# Patient Record
Sex: MALE | Race: BLACK OR AFRICAN AMERICAN | NOT HISPANIC OR LATINO | Employment: FULL TIME | URBAN - METROPOLITAN AREA
[De-identification: names, ages, dates, MRNs, and addresses within clinical notes are randomized per-mention and may not be internally consistent; named-entity substitution may affect disease eponyms.]

---

## 2023-06-10 ENCOUNTER — HOSPITAL ENCOUNTER (EMERGENCY)
Facility: HOSPITAL | Age: 23
Discharge: LEFT BEFORE DISCHARGE | End: 2023-06-10
Payer: MEDICAID

## 2023-06-10 VITALS
OXYGEN SATURATION: 98 % | DIASTOLIC BLOOD PRESSURE: 80 MMHG | TEMPERATURE: 98 F | HEART RATE: 89 BPM | RESPIRATION RATE: 18 BRPM | SYSTOLIC BLOOD PRESSURE: 130 MMHG

## 2023-06-10 DIAGNOSIS — R00.0 TACHYCARDIA: ICD-10-CM

## 2023-06-10 LAB
ABS NEUT (OLG): 4.12 X10(3)/MCL (ref 2.1–9.2)
ALBUMIN SERPL-MCNC: 4.4 G/DL (ref 3.5–5)
ALBUMIN/GLOB SERPL: 1.4 RATIO (ref 1.1–2)
ALP SERPL-CCNC: 79 UNIT/L (ref 40–150)
ALT SERPL-CCNC: 21 UNIT/L (ref 0–55)
AST SERPL-CCNC: 18 UNIT/L (ref 5–34)
BASOPHILS NFR BLD MANUAL: 0.12 X10(3)/MCL (ref 0–0.2)
BASOPHILS NFR BLD MANUAL: 2 %
BILIRUBIN DIRECT+TOT PNL SERPL-MCNC: 0.6 MG/DL
BUN SERPL-MCNC: 8.9 MG/DL (ref 8.9–20.6)
CALCIUM SERPL-MCNC: 9.4 MG/DL (ref 8.4–10.2)
CHLORIDE SERPL-SCNC: 103 MMOL/L (ref 98–107)
CO2 SERPL-SCNC: 27 MMOL/L (ref 22–29)
CREAT SERPL-MCNC: 0.86 MG/DL (ref 0.73–1.18)
EOSINOPHIL NFR BLD MANUAL: 0.06 X10(3)/MCL (ref 0–0.9)
EOSINOPHIL NFR BLD MANUAL: 1 %
ERYTHROCYTE [DISTWIDTH] IN BLOOD BY AUTOMATED COUNT: 13.1 % (ref 11.5–17)
GFR SERPLBLD CREATININE-BSD FMLA CKD-EPI: >60 MLS/MIN/1.73/M2
GLOBULIN SER-MCNC: 3.2 GM/DL (ref 2.4–3.5)
GLUCOSE SERPL-MCNC: 82 MG/DL (ref 74–100)
HCT VFR BLD AUTO: 45.2 % (ref 42–52)
HGB BLD-MCNC: 15.4 G/DL (ref 14–18)
INSTRUMENT WBC (OLG): 6.25 X10(3)/MCL
LYMPHOCYTES NFR BLD MANUAL: 1.69 X10(3)/MCL
LYMPHOCYTES NFR BLD MANUAL: 27 %
MCH RBC QN AUTO: 31 PG (ref 27–31)
MCHC RBC AUTO-ENTMCNC: 34.1 G/DL (ref 33–36)
MCV RBC AUTO: 90.9 FL (ref 80–94)
MONOCYTES NFR BLD MANUAL: 0.25 X10(3)/MCL (ref 0.1–1.3)
MONOCYTES NFR BLD MANUAL: 4 %
NEUTROPHILS NFR BLD MANUAL: 66 %
NRBC BLD AUTO-RTO: 0 %
PLATELET # BLD AUTO: 221 X10(3)/MCL (ref 130–400)
PLATELET # BLD EST: NORMAL 10*3/UL
PMV BLD AUTO: 10.3 FL (ref 7.4–10.4)
POTASSIUM SERPL-SCNC: 4 MMOL/L (ref 3.5–5.1)
PROT SERPL-MCNC: 7.6 GM/DL (ref 6.4–8.3)
RBC # BLD AUTO: 4.97 X10(6)/MCL (ref 4.7–6.1)
RBC MORPH BLD: NORMAL
SODIUM SERPL-SCNC: 138 MMOL/L (ref 136–145)
TROPONIN I SERPL-MCNC: <0.01 NG/ML (ref 0–0.04)
WBC # SPEC AUTO: 6.25 X10(3)/MCL (ref 4.5–11.5)

## 2023-06-10 PROCEDURE — 93010 ELECTROCARDIOGRAM REPORT: CPT | Mod: ,,, | Performed by: INTERNAL MEDICINE

## 2023-06-10 PROCEDURE — 85027 COMPLETE CBC AUTOMATED: CPT

## 2023-06-10 PROCEDURE — 80053 COMPREHEN METABOLIC PANEL: CPT

## 2023-06-10 PROCEDURE — 84484 ASSAY OF TROPONIN QUANT: CPT

## 2023-06-10 PROCEDURE — 99285 EMERGENCY DEPT VISIT HI MDM: CPT | Mod: 25

## 2023-06-10 PROCEDURE — 93005 ELECTROCARDIOGRAM TRACING: CPT

## 2023-06-10 PROCEDURE — 93010 EKG 12-LEAD: ICD-10-PCS | Mod: ,,, | Performed by: INTERNAL MEDICINE

## 2023-06-11 NOTE — FIRST PROVIDER EVALUATION
Medical screening examination initiated.  I have conducted a focused provider triage encounter, findings are as follows:    Brief history of present illness:  arrived to ed due to tachycardia. Reports he is in a-fib. Hx of a fib. Currently on cardizem. Does not see cardiology or PCP regarding diagnosis.     Vitals:    06/10/23 1856   BP: 136/87   Pulse: 106   Resp: 18   Temp: 98.1 °F (36.7 °C)   TempSrc: Oral   SpO2: 99%       Pertinent physical exam:  awake, alert, follows commands    Brief workup plan:  labs, imaging, ekg     Preliminary workup initiated; this workup will be continued and followed by the physician or advanced practice provider that is assigned to the patient when roomed.

## 2023-08-07 ENCOUNTER — OFFICE VISIT (OUTPATIENT)
Dept: URGENT CARE | Facility: CLINIC | Age: 23
End: 2023-08-07
Payer: MEDICAID

## 2023-08-07 VITALS
SYSTOLIC BLOOD PRESSURE: 141 MMHG | BODY MASS INDEX: 22.13 KG/M2 | DIASTOLIC BLOOD PRESSURE: 75 MMHG | HEIGHT: 69 IN | HEART RATE: 57 BPM | WEIGHT: 149.38 LBS | RESPIRATION RATE: 16 BRPM | TEMPERATURE: 98 F | OXYGEN SATURATION: 100 %

## 2023-08-07 DIAGNOSIS — Z86.79 HISTORY OF ATRIAL FIBRILLATION: Primary | ICD-10-CM

## 2023-08-07 PROBLEM — I48.91 ATRIAL FIBRILLATION: Status: ACTIVE | Noted: 2022-10-14

## 2023-08-07 PROCEDURE — 99203 OFFICE O/P NEW LOW 30 MIN: CPT | Mod: S$PBB,,, | Performed by: FAMILY MEDICINE

## 2023-08-07 PROCEDURE — 99203 PR OFFICE/OUTPT VISIT, NEW, LEVL III, 30-44 MIN: ICD-10-PCS | Mod: S$PBB,,, | Performed by: FAMILY MEDICINE

## 2023-08-07 PROCEDURE — 99215 OFFICE O/P EST HI 40 MIN: CPT | Mod: PBBFAC | Performed by: FAMILY MEDICINE

## 2023-08-07 RX ORDER — DILTIAZEM HYDROCHLORIDE 180 MG/1
180 CAPSULE, COATED, EXTENDED RELEASE ORAL EVERY MORNING
COMMUNITY
Start: 2023-07-04 | End: 2023-08-07 | Stop reason: SDUPTHER

## 2023-08-07 RX ORDER — ASPIRIN 81 MG/1
81 TABLET ORAL DAILY
COMMUNITY

## 2023-08-07 RX ORDER — DILTIAZEM HYDROCHLORIDE 180 MG/1
180 CAPSULE, COATED, EXTENDED RELEASE ORAL EVERY MORNING
Qty: 30 CAPSULE | Refills: 3 | Status: SHIPPED | OUTPATIENT
Start: 2023-08-07 | End: 2024-01-29 | Stop reason: SDUPTHER

## 2023-08-07 NOTE — PROGRESS NOTES
"Subjective:      Patient ID: Rich Pendleton is a 22 y.o. male.    Vitals:  height is 5' 9.29" (1.76 m) and weight is 67.8 kg (149 lb 6.4 oz). His temperature is 98.4 °F (36.9 °C). His blood pressure is 141/75 (abnormal) and his pulse is 57 (abnormal). His respiration is 16 and oxygen saturation is 100%.     Chief Complaint: Referral (PCP. States out of Diltiazem 180mg )    Patient requesting a refill on diltiazem.  States he ran out yesterday.  Currently asymptomatic.  History of atrial fibrillation.  Lost to follow-up and is requesting a PCP and cardiology referral.        Constitution: Negative.   HENT: Negative.     Neck: neck negative.   Cardiovascular: Negative.    Eyes: Negative.    Respiratory: Negative.     Gastrointestinal: Negative.    Genitourinary: Negative.    Musculoskeletal: Negative.    Skin: Negative.    Neurological: Negative.    Psychiatric/Behavioral: Negative.        Objective:     Physical Exam   Constitutional: He appears well-developed.  Non-toxic appearance. He does not appear ill. No distress.   Cardiovascular: Normal rate, regular rhythm, normal heart sounds and normal pulses.   Pulmonary/Chest: Effort normal and breath sounds normal.   Abdominal: He exhibits no distension. Soft. There is no abdominal tenderness.   Musculoskeletal: Normal range of motion.         General: Normal range of motion.   Skin: Skin is warm, dry and not diaphoretic.   Nursing note and vitals reviewed.      Assessment:     1. History of atrial fibrillation        Plan:   Agreed to refill diltiazem as requested.  Refer to Cardiology and to establish PCP.  ER precautions    History of atrial fibrillation  -     Ambulatory referral/consult to Cardiology  -     Ambulatory referral/consult to Family Practice    Other orders  -     diltiaZEM (CARDIZEM CD) 180 MG 24 hr capsule; Take 1 capsule (180 mg total) by mouth every morning.  Dispense: 30 capsule; Refill: 3                    "

## 2023-10-10 ENCOUNTER — OFFICE VISIT (OUTPATIENT)
Dept: CARDIOLOGY | Facility: CLINIC | Age: 23
End: 2023-10-10
Payer: MEDICAID

## 2023-10-10 VITALS
SYSTOLIC BLOOD PRESSURE: 116 MMHG | RESPIRATION RATE: 20 BRPM | HEART RATE: 52 BPM | HEIGHT: 69 IN | BODY MASS INDEX: 21.98 KG/M2 | DIASTOLIC BLOOD PRESSURE: 64 MMHG | TEMPERATURE: 98 F | OXYGEN SATURATION: 100 % | WEIGHT: 148.38 LBS

## 2023-10-10 DIAGNOSIS — I48.0 PAROXYSMAL ATRIAL FIBRILLATION: Primary | ICD-10-CM

## 2023-10-10 PROCEDURE — 93005 ELECTROCARDIOGRAM TRACING: CPT

## 2023-10-10 PROCEDURE — 99213 OFFICE O/P EST LOW 20 MIN: CPT | Mod: PBBFAC | Performed by: INTERNAL MEDICINE

## 2023-10-10 NOTE — PROGRESS NOTES
10/10/2023 2:18 PM    Subjective:     CHIEF COMPLAINT:   Chief Complaint   Patient presents with    initial visit hx of heart disease denies chest pain or sob                            HPI:    Mr. Rich Pendleton is a 22 y.o. male with paroxysmal atrial fibrillation rate controlled with use of calcium channel blocker who presents to clinic today for new patient evaluation.  He reports intermittent palpitations when he is nonadherent with his rate-controlling medications but otherwise is asymptomatic.  He denies any presyncope or syncopal events.  He reports he takes daily aspirin 81 mg daily but is not on anticoagulation.  He denies any bleeding complications as it relates to use of his anti-platelet therapy.  He was offered referral for consideration for PVI given his youth and an attempt to try to rhythm control him to prevent future long-term sequelae of paroxysmal atrial fibrillation progressing to a more chronic state but preferred a rate control strategy.    Lab Results   Component Value Date    CREATININE 0.86 06/10/2023    CREATININE 0.94 10/14/2022     Past Medical History    Patient Active Problem List   Diagnosis    Atrial fibrillation      Surgical History  History reviewed. No pertinent surgical history.    Social History     Socioeconomic History    Marital status: Single   Tobacco Use    Smoking status: Never    Smokeless tobacco: Never   Substance and Sexual Activity    Alcohol use: Not Currently     Comment: social    Drug use: Never    Sexual activity: Yes       History reviewed. No pertinent family history.  Review of patient's allergies indicates:  No Known Allergies    Current Medications    Current Outpatient Medications   Medication Instructions    aspirin (ECOTRIN) 81 mg, Oral, Daily    diltiaZEM (CARDIZEM CD) 180 mg, Oral, Every morning         ROS:   Denies headaches, changes in vision, nausea or vomiting, fevers, chills, chest pain, palpitations, dyspnea, abdominal pain, or change in  "urinary or bowel habits.    Objective:     BP Readings from Last 3 Encounters:   10/10/23 116/64   08/07/23 (!) 141/75   06/10/23 130/80        Pulse Readings from Last 3 Encounters:   10/10/23 (!) 52   08/07/23 (!) 57   06/10/23 89        Temp Readings from Last 3 Encounters:   10/10/23 98.2 °F (36.8 °C) (Oral)   08/07/23 98.4 °F (36.9 °C)   06/10/23 98.1 °F (36.7 °C) (Oral)       Wt Readings from Last 3 Encounters:   10/10/23 67.3 kg (148 lb 5.9 oz)   08/07/23 67.8 kg (149 lb 6.4 oz)         PE:  Blood pressure 116/64, pulse (!) 52, temperature 98.2 °F (36.8 °C), temperature source Oral, resp. rate 20, height 5' 9" (1.753 m), weight 67.3 kg (148 lb 5.9 oz), SpO2 100 %.   Physical Exam  Vitals reviewed.   Constitutional:       General: He is not in acute distress.     Appearance: Normal appearance. He is normal weight. He is not ill-appearing.   HENT:      Head: Normocephalic and atraumatic.      Right Ear: External ear normal.      Left Ear: External ear normal.      Nose: Nose normal.      Mouth/Throat:      Mouth: Mucous membranes are moist.   Eyes:      Conjunctiva/sclera: Conjunctivae normal.   Cardiovascular:      Rate and Rhythm: Normal rate and regular rhythm.      Pulses: Normal pulses.      Heart sounds: Normal heart sounds. No murmur heard.  Pulmonary:      Effort: Pulmonary effort is normal. No respiratory distress.      Breath sounds: Normal breath sounds. No stridor. No wheezing, rhonchi or rales.   Chest:      Chest wall: No tenderness.   Abdominal:      General: Abdomen is flat. Bowel sounds are normal. There is no distension.      Palpations: Abdomen is soft.   Musculoskeletal:      Cervical back: Neck supple.      Right lower leg: No edema.      Left lower leg: No edema.   Skin:     General: Skin is warm and dry.      Capillary Refill: Capillary refill takes less than 2 seconds.   Neurological:      Mental Status: He is alert and oriented to person, place, and time.   Psychiatric:         Mood and " "Affect: Mood normal.         Behavior: Behavior normal.                                                                                                                                                                                                                                                                                                                                                                                                                                                                                CARDIAC TESTING:  Echocardiogram  No results found for this or any previous visit.      Stress Test  No results found for this or any previous visit.     Coronary Angiogram  No results found for this or any previous visit.     Holter Monitor  No cardiac monitor results found for the past 12 months    Last BMP BMP  Lab Results   Component Value Date     06/10/2023    K 4.0 06/10/2023     10/14/2022    CO2 27 06/10/2023    BUN 8.9 06/10/2023    CREATININE 0.86 06/10/2023    CALCIUM 9.4 06/10/2023    ANIONGAP 10 10/14/2022    EGFRNORACEVR >60 06/10/2023      Last CBC     Lab Results   Component Value Date    WBC 6.25 06/10/2023    WBC 6.25 06/10/2023    HGB 15.4 06/10/2023    HCT 45.2 06/10/2023    MCV 90.9 06/10/2023     06/10/2023           BNP  No results found for: "BNP"  Last lipids  No results found for: "CHOL", "HDL", "LDL", "TRIG", "TOTALCHOLEST"   LFT     Lab Results   Component Value Date    ALT 21 06/10/2023    AST 18 06/10/2023       Plan:     Mr. Rich Pendleton is a 22 y.o. male with paroxysmal atrial fibrillation rate controlled with use of calcium channel blocker who presents to clinic today for new patient evaluation.     Paroxysmal atrial fibrillation  CHADS-VASc score 0.  He does not warrant anticoagulation.  Continue diltiazem 180 mg daily for rate control strategy.  Offered him rhythm control strategy in the form of pulmonary vein isolation but he deferred at this time.  " Obtain a TSH.  Denies symptoms of NEETA.  Denies alcohol use.  Denies tobacco use.  Denies illicit drug use.    Lawson Mckenzie MD  [employer] Cardiology Fellow

## 2024-01-29 DIAGNOSIS — I48.0 PAROXYSMAL ATRIAL FIBRILLATION: Primary | ICD-10-CM

## 2024-01-29 RX ORDER — DILTIAZEM HYDROCHLORIDE 180 MG/1
180 CAPSULE, COATED, EXTENDED RELEASE ORAL EVERY MORNING
Qty: 30 CAPSULE | Refills: 3 | Status: SHIPPED | OUTPATIENT
Start: 2024-01-29

## 2024-03-06 ENCOUNTER — ON-DEMAND VIRTUAL (OUTPATIENT)
Dept: URGENT CARE | Facility: CLINIC | Age: 24
End: 2024-03-06
Payer: MEDICAID

## 2024-03-06 DIAGNOSIS — K08.89 PAIN, DENTAL: Primary | ICD-10-CM

## 2024-03-06 DIAGNOSIS — S02.5XXA: ICD-10-CM

## 2024-03-06 PROCEDURE — 99214 OFFICE O/P EST MOD 30 MIN: CPT | Mod: 95,S$GLB,, | Performed by: NURSE PRACTITIONER

## 2024-03-06 RX ORDER — IBUPROFEN 800 MG/1
800 TABLET ORAL 3 TIMES DAILY
Qty: 30 TABLET | Refills: 0 | Status: SHIPPED | OUTPATIENT
Start: 2024-03-06 | End: 2024-03-16

## 2024-03-06 RX ORDER — AMOXICILLIN 875 MG/1
875 TABLET, FILM COATED ORAL 2 TIMES DAILY
Qty: 20 TABLET | Refills: 0 | Status: SHIPPED | OUTPATIENT
Start: 2024-03-06 | End: 2024-03-07

## 2024-03-06 NOTE — PROGRESS NOTES
Subjective:      Patient ID: Rich Pendleotn is a 23 y.o. male.    Vitals:  vitals were not taken for this visit.     Chief Complaint: Dental Pain      Visit Type: TELE AUDIOVISUAL    Present with the patient at the time of consultation: TELEMED PRESENT WITH PATIENT: family member    Past Medical History:   Diagnosis Date    Atrial fibrillation      History reviewed. No pertinent surgical history.  Review of patient's allergies indicates:  No Known Allergies  Current Outpatient Medications on File Prior to Visit   Medication Sig Dispense Refill    aspirin (ECOTRIN) 81 MG EC tablet Take 81 mg by mouth once daily.      diltiaZEM (CARDIZEM CD) 180 MG 24 hr capsule Take 1 capsule (180 mg total) by mouth every morning. 30 capsule 3     No current facility-administered medications on file prior to visit.     History reviewed. No pertinent family history.    Medications Ordered                [id number] - [place] - [address] [facility] [address] [facility]   [address] [address]    Telephone: [phone]   Fax: [phone]   Hours: Not open 24 hours                         E-Prescribed (2 of 2)              amoxicillin (AMOXIL) 875 MG tablet    Sig: Take 1 tablet (875 mg total) by mouth 2 (two) times daily. for 10 days       Start: 3/6/24     Quantity: 20 tablet Refills: 0                         ibuprofen (ADVIL,MOTRIN) 800 MG tablet    Sig: Take 1 tablet (800 mg total) by mouth 3 (three) times daily. for 10 days       Start: 3/6/24     Quantity: 30 tablet Refills: 0                           Ohs Peq Odvv Intake    3/6/2024  8:48 AM CST - Filed by Patient   What is your current physical address in the event of a medical emergency? [address] [place] [address]   Are you able to take your vital signs? No   Please attach any relevant images or files          23 year old male calls in today with complaints of pain to top back tooth. He  states he has a cracked tooth and needs to have dental work. He just had his Medicaid reinstated. No fevers. No difficulty opening jaw.   Went to dentist a couple of weeks ago.             HENT:  Positive for dental problem.         Objective:   The physical exam was conducted virtually.  Physical Exam   Constitutional: He is oriented to person, place, and time. No distress.   HENT:   Head: Normocephalic and atraumatic.   Mouth/Throat: Oropharynx is clear and moist and mucous membranes are normal.   Eyes: Conjunctivae are normal. No scleral icterus.   Pulmonary/Chest: Effort normal. No respiratory distress.   Musculoskeletal: Normal range of motion.         General: Normal range of motion.   Neurological: He is alert and oriented to person, place, and time.   Skin: Skin is not diaphoretic.   Psychiatric: His behavior is normal. Judgment and thought content normal.   Vitals reviewed.      Assessment:     1. Pain, dental    2. Cracked root of tooth        Plan:       Pain, dental  -     amoxicillin (AMOXIL) 875 MG tablet; Take 1 tablet (875 mg total) by mouth 2 (two) times daily. for 10 days  Dispense: 20 tablet; Refill: 0  -     ibuprofen (ADVIL,MOTRIN) 800 MG tablet; Take 1 tablet (800 mg total) by mouth 3 (three) times daily. for 10 days  Dispense: 30 tablet; Refill: 0    Cracked root of tooth    -follow up with dentist    Thank you for choosing [facility]!    Our goal in the [facility] is to always provide outstanding medical care. You may receive a survey by mail or e-mail in the next week regarding your experience today. We would greatly appreciate you completing and returning the survey. Your feedback provides us with a way to recognize our staff who provide very good care, and it helps us learn how to improve when your experience was below our aspiration of excellence.         We appreciate you trusting us with your medical care. We hope you feel better soon. We will be  happy to take care of you for all of your future medical needs.    You must understand that you've received an Urgent Care treatment only and that you may be released before all your medical problems are known or treated. You, the patient, will arrange for follow up care as instructed.    Follow up with your PCP or specialty clinic as directed in the next 1-2 weeks if not improved or as needed.  You can call [phone] to schedule an appointment with the appropriate provider.    If your condition worsens we recommend that you receive another evaluation in person, with your primary care provider, urgent care or at the emergency room immediately or contact your primary medical clinics after hours call service to discuss your concerns.

## 2024-03-07 ENCOUNTER — ON-DEMAND VIRTUAL (OUTPATIENT)
Dept: URGENT CARE | Facility: CLINIC | Age: 24
End: 2024-03-07
Payer: MEDICAID

## 2024-03-07 DIAGNOSIS — K08.89 PAIN, DENTAL: Primary | ICD-10-CM

## 2024-03-07 PROCEDURE — 99499 UNLISTED E&M SERVICE: CPT | Mod: 95,,, | Performed by: PHYSICIAN ASSISTANT

## 2024-03-07 RX ORDER — AMOXICILLIN AND CLAVULANATE POTASSIUM 500; 125 MG/1; MG/1
1 TABLET, FILM COATED ORAL 3 TIMES DAILY
Qty: 21 TABLET | Refills: 0 | Status: SHIPPED | OUTPATIENT
Start: 2024-03-07 | End: 2024-03-14

## 2024-03-08 NOTE — PROGRESS NOTES
Subjective:      Patient ID: Rich Pendleton is a 23 y.o. male.    Vitals:  vitals were not taken for this visit.     Chief Complaint: Dental Pain      Visit Type: TELE AUDIOVISUAL    Present with the patient at the time of consultation: TELEMED PRESENT WITH PATIENT: None at home    Past Medical History:   Diagnosis Date    Atrial fibrillation      History reviewed. No pertinent surgical history.  Review of patient's allergies indicates:  No Known Allergies  Current Outpatient Medications on File Prior to Visit   Medication Sig Dispense Refill    aspirin (ECOTRIN) 81 MG EC tablet Take 81 mg by mouth once daily.      diltiaZEM (CARDIZEM CD) 180 MG 24 hr capsule Take 1 capsule (180 mg total) by mouth every morning. 30 capsule 3    ibuprofen (ADVIL,MOTRIN) 800 MG tablet Take 1 tablet (800 mg total) by mouth 3 (three) times daily. for 10 days 30 tablet 0    [DISCONTINUED] amoxicillin (AMOXIL) 875 MG tablet Take 1 tablet (875 mg total) by mouth 2 (two) times daily. for 10 days 20 tablet 0     No current facility-administered medications on file prior to visit.     History reviewed. No pertinent family history.    Medications Ordered                [id number] [place] [address] [address] [facility] [address] [facility] [address]   [address] [place] [address]    Telephone: [phone]   Fax: [phone]   Hours: Not open 24 hours                         E-Prescribed (1 of 1)              amoxicillin-clavulanate 500-125mg (AUGMENTIN) 500-125 mg Tab    Sig: Take 1 tablet (500 mg total) by mouth 3 (three) times daily. for 7 days       Start: 3/7/24     Quantity: 21 tablet Refills: 0                           Ohs Peq Odvv Intake    3/7/2024 10:28 PM CST - Filed by Patient   What is your current physical address in the event of a medical emergency? [address]   Are you able to take your vital signs? No   Please attach any relevant images or files           HPI  24yo male presents for follow up. Was seen yesterday for dental pain of right back lower tooth, prescribed antibiotic and ibuprofen. Has had four doses of amoxicillin without improvement. Denies fevers, purulent drainage, sore throat, trouble swallowing, facial redness or swelling, neck pain/stiffness.          Constitution: Negative for activity change, appetite change, chills and fever.   HENT:  Positive for dental problem. Negative for facial swelling, sore throat and trouble swallowing.    Respiratory:  Negative for cough and shortness of breath.         Objective:   The physical exam was conducted virtually.  Physical Exam   Constitutional: He is oriented to person, place, and time.  Non-toxic appearance. He does not appear ill. No distress.   HENT:   Head: Normocephalic and atraumatic.      Comments: No facial erythema or edema.   Mouth/Throat: No trismus in the jaw.      Comments: Unable to visualize tooth with lighting.   Neck: Neck supple. No neck rigidity present.   Pulmonary/Chest: Effort normal. No respiratory distress.   Abdominal: Normal appearance.   Musculoskeletal:      Cervical back: He exhibits no tenderness.   Lymphadenopathy:     He has no cervical adenopathy.   Neurological: He is alert and oriented to person, place, and time. Coordination normal.   Skin: Skin is dry, not diaphoretic, not pale and no rash.   Psychiatric: His behavior is normal. Judgment and thought content normal.       Assessment:     1. Pain, dental        Plan:       Pain, dental    Other orders  -     amoxicillin-clavulanate 500-125mg (AUGMENTIN) 500-125 mg Tab; Take 1 tablet (500 mg total) by mouth 3 (three) times daily. for 7 days  Dispense: 21 tablet; Refill: 0    Stop amoxicillin, will change to augmentin.     1. Start new antibiotic, take as directed.  2. Please make appointment for dentist in next 2-3 days.  3. Continue tylenol and ibuprofen as needed for pain. Also recommend warm compresses over affected  area every few hours.  4. If no improvement in 2 days recommend to be seen in person at local urgent care or with your primary care provider. Please be seen sooner if any worsening pain, spreading of pain into jaw/face, facial swelling/redness, trouble swallowing or opening/closing jaw, fevers or other concerns.   5.  You must understand that you've received a Telehealth Urgent Care treatment only and that you may be released before all your medical problems are known or treated. You, the patient, will arrange for follow up care as instructed.???  Patient voiced understanding and agrees to plan.

## 2024-03-08 NOTE — PATIENT INSTRUCTIONS
1. Stop current antibiotic and start new antibiotic sent to your pharmacy, take as directed.  2. Please make appointment for dentist in next 2-3 days.  3. Continue tylenol and ibuprofen as needed for pain. Also recommend warm compresses over affected area every few hours.  4. If no improvement in 2 days recommend to be seen in person at local urgent care or with your primary care provider. Please be seen sooner if any worsening pain, spreading of pain into jaw/face, facial swelling/redness, trouble swallowing or opening/closing jaw, fevers or other concerns.   5.  You must understand that you've received a Telehealth Urgent Care treatment only and that you may be released before all your medical problems are known or treated. You, the patient, will arrange for follow up care as instructed.???

## 2024-08-13 ENCOUNTER — HOSPITAL ENCOUNTER (EMERGENCY)
Facility: HOSPITAL | Age: 24
Discharge: LEFT BEFORE DISCHARGE | End: 2024-08-13
Attending: STUDENT IN AN ORGANIZED HEALTH CARE EDUCATION/TRAINING PROGRAM
Payer: MEDICAID

## 2024-08-13 VITALS
WEIGHT: 160 LBS | DIASTOLIC BLOOD PRESSURE: 87 MMHG | OXYGEN SATURATION: 99 % | BODY MASS INDEX: 24.25 KG/M2 | HEIGHT: 68 IN | SYSTOLIC BLOOD PRESSURE: 130 MMHG | RESPIRATION RATE: 20 BRPM | TEMPERATURE: 99 F | HEART RATE: 85 BPM

## 2024-08-13 DIAGNOSIS — E83.52 HYPERCALCEMIA: ICD-10-CM

## 2024-08-13 DIAGNOSIS — E86.0 DEHYDRATION: ICD-10-CM

## 2024-08-13 DIAGNOSIS — N17.9 AKI (ACUTE KIDNEY INJURY): Primary | ICD-10-CM

## 2024-08-13 LAB
ALBUMIN SERPL-MCNC: 5.9 G/DL (ref 3.5–5)
ALBUMIN/GLOB SERPL: 1.2 RATIO (ref 1.1–2)
ALP SERPL-CCNC: 97 UNIT/L (ref 40–150)
ALT SERPL-CCNC: 40 UNIT/L (ref 0–55)
ANION GAP SERPL CALC-SCNC: 17 MEQ/L
AST SERPL-CCNC: 33 UNIT/L (ref 5–34)
BASOPHILS # BLD AUTO: 0.07 X10(3)/MCL
BASOPHILS NFR BLD AUTO: 0.4 %
BILIRUB SERPL-MCNC: 0.7 MG/DL
BUN SERPL-MCNC: 18.7 MG/DL (ref 8.9–20.6)
CALCIUM SERPL-MCNC: 12.1 MG/DL (ref 8.4–10.2)
CHLORIDE SERPL-SCNC: 95 MMOL/L (ref 98–107)
CK SERPL-CCNC: 534 U/L (ref 30–200)
CO2 SERPL-SCNC: 26 MMOL/L (ref 22–29)
CREAT SERPL-MCNC: 3.42 MG/DL (ref 0.73–1.18)
CREAT/UREA NIT SERPL: 5
EOSINOPHIL # BLD AUTO: 0 X10(3)/MCL (ref 0–0.9)
EOSINOPHIL NFR BLD AUTO: 0 %
ERYTHROCYTE [DISTWIDTH] IN BLOOD BY AUTOMATED COUNT: 12.5 % (ref 11.5–17)
GFR SERPLBLD CREATININE-BSD FMLA CKD-EPI: 24 ML/MIN/1.73/M2
GLOBULIN SER-MCNC: 4.8 GM/DL (ref 2.4–3.5)
GLUCOSE SERPL-MCNC: 134 MG/DL (ref 74–100)
HCT VFR BLD AUTO: 51.9 % (ref 42–52)
HGB BLD-MCNC: 18.5 G/DL (ref 14–18)
IMM GRANULOCYTES # BLD AUTO: 0.43 X10(3)/MCL (ref 0–0.04)
IMM GRANULOCYTES NFR BLD AUTO: 2.3 %
LYMPHOCYTES # BLD AUTO: 0.97 X10(3)/MCL (ref 0.6–4.6)
LYMPHOCYTES NFR BLD AUTO: 5.1 %
MCH RBC QN AUTO: 31 PG (ref 27–31)
MCHC RBC AUTO-ENTMCNC: 35.6 G/DL (ref 33–36)
MCV RBC AUTO: 87.1 FL (ref 80–94)
MONOCYTES # BLD AUTO: 0.97 X10(3)/MCL (ref 0.1–1.3)
MONOCYTES NFR BLD AUTO: 5.1 %
NEUTROPHILS # BLD AUTO: 16.43 X10(3)/MCL (ref 2.1–9.2)
NEUTROPHILS NFR BLD AUTO: 87.1 %
NRBC BLD AUTO-RTO: 0 %
PLATELET # BLD AUTO: 280 X10(3)/MCL (ref 130–400)
PLATELETS.RETICULATED NFR BLD AUTO: 8.4 % (ref 0.9–11.2)
PMV BLD AUTO: 10 FL (ref 7.4–10.4)
POTASSIUM SERPL-SCNC: 4.2 MMOL/L (ref 3.5–5.1)
PROT SERPL-MCNC: 10.7 GM/DL (ref 6.4–8.3)
RBC # BLD AUTO: 5.96 X10(6)/MCL (ref 4.7–6.1)
SODIUM SERPL-SCNC: 138 MMOL/L (ref 136–145)
WBC # BLD AUTO: 18.87 X10(3)/MCL (ref 4.5–11.5)

## 2024-08-13 PROCEDURE — 82550 ASSAY OF CK (CPK): CPT | Performed by: PHYSICIAN ASSISTANT

## 2024-08-13 PROCEDURE — 99291 CRITICAL CARE FIRST HOUR: CPT | Mod: 25

## 2024-08-13 PROCEDURE — 80053 COMPREHEN METABOLIC PANEL: CPT | Performed by: PHYSICIAN ASSISTANT

## 2024-08-13 PROCEDURE — 63600175 PHARM REV CODE 636 W HCPCS: Performed by: PHYSICIAN ASSISTANT

## 2024-08-13 PROCEDURE — 96374 THER/PROPH/DIAG INJ IV PUSH: CPT

## 2024-08-13 PROCEDURE — 25000003 PHARM REV CODE 250: Performed by: PHYSICIAN ASSISTANT

## 2024-08-13 PROCEDURE — 85025 COMPLETE CBC W/AUTO DIFF WBC: CPT | Performed by: PHYSICIAN ASSISTANT

## 2024-08-13 RX ORDER — SODIUM CHLORIDE 9 MG/ML
1000 INJECTION, SOLUTION INTRAVENOUS
Status: DISCONTINUED | OUTPATIENT
Start: 2024-08-13 | End: 2024-08-13 | Stop reason: HOSPADM

## 2024-08-13 RX ORDER — SODIUM CHLORIDE 9 MG/ML
1000 INJECTION, SOLUTION INTRAVENOUS
Status: COMPLETED | OUTPATIENT
Start: 2024-08-13 | End: 2024-08-13

## 2024-08-13 RX ORDER — ONDANSETRON 4 MG/1
4 TABLET, ORALLY DISINTEGRATING ORAL EVERY 6 HOURS PRN
Qty: 12 TABLET | Refills: 0 | Status: SHIPPED | OUTPATIENT
Start: 2024-08-13

## 2024-08-13 RX ORDER — ONDANSETRON HYDROCHLORIDE 2 MG/ML
4 INJECTION, SOLUTION INTRAVENOUS
Status: COMPLETED | OUTPATIENT
Start: 2024-08-13 | End: 2024-08-13

## 2024-08-13 RX ADMIN — SODIUM CHLORIDE 1000 ML: 9 INJECTION, SOLUTION INTRAVENOUS at 06:08

## 2024-08-13 RX ADMIN — ONDANSETRON 4 MG: 2 INJECTION INTRAMUSCULAR; INTRAVENOUS at 06:08

## 2024-08-13 RX ADMIN — SODIUM CHLORIDE 1000 ML: 9 INJECTION, SOLUTION INTRAVENOUS at 07:08

## 2024-08-13 NOTE — FIRST PROVIDER EVALUATION
"Medical screening examination initiated.  I have conducted a focused provider triage encounter, findings are as follows:    Brief history of present illness:  23yoAAM with sweating, nausea, vomiting. Works outside in heat. Took heart meds on empty stomach this am. Has not eaten much. Noticed cramping    Vitals:    08/13/24 1829   BP: 130/87   Pulse: 85   Resp: 20   Temp: 98.8 °F (37.1 °C)   TempSrc: Oral   SpO2: 99%   Weight: 72.6 kg (160 lb)   Height: 5' 8" (1.727 m)       Pertinent physical exam:  NAD    Brief workup plan:  labs    Preliminary workup initiated; this workup will be continued and followed by the physician or advanced practice provider that is assigned to the patient when roomed.  "

## 2024-08-14 NOTE — ED PROVIDER NOTES
Encounter Date: 8/13/2024       History     Chief Complaint   Patient presents with    Emesis     Reports working in heat today. Feels as if he is dehydrated. Began vomiting after work. Denies abd pain. GCS 15.     Rich Pendleton is a 23 y.o. male with a past medical history of A Fib who presents to the ED for nausea, vomiting, muscle cramps, generalized body aches after working in the heat all day today.  He reports excessive sweating.  He denies any fevers or chills.  He denies any chest pain or shortness of breath.  He denies any abdominal pain.         Review of patient's allergies indicates:  No Known Allergies  Past Medical History:   Diagnosis Date    Atrial fibrillation      No past surgical history on file.  No family history on file.  Social History     Tobacco Use    Smoking status: Never    Smokeless tobacco: Never   Substance Use Topics    Alcohol use: Not Currently     Comment: social    Drug use: Never     Review of Systems   Constitutional:  Negative for chills and fever.   HENT:  Negative for drooling and sore throat.    Eyes:  Negative for pain and redness.   Respiratory:  Negative for shortness of breath, wheezing and stridor.    Cardiovascular:  Negative for chest pain, palpitations and leg swelling.   Gastrointestinal:  Negative for abdominal pain, nausea and vomiting.   Genitourinary:  Negative for dysuria and hematuria.   Musculoskeletal:  Negative for back pain, neck pain and neck stiffness.   Skin:  Negative for rash and wound.   Neurological:  Positive for weakness. Negative for numbness.   Hematological:  Does not bruise/bleed easily.       Physical Exam     Initial Vitals [08/13/24 1829]   BP Pulse Resp Temp SpO2   130/87 85 20 98.8 °F (37.1 °C) 99 %      MAP       --         Physical Exam    Nursing note and vitals reviewed.  Constitutional: He appears well-developed.   HENT:   Head: Normocephalic and atraumatic.   Eyes: EOM are normal. Pupils are equal, round, and reactive to light.    Cardiovascular:  Normal rate and regular rhythm.           No murmur heard.  Pulmonary/Chest: Breath sounds normal. No respiratory distress. He has no wheezes. He has no rales.   Abdominal: Abdomen is soft. He exhibits no distension. There is no abdominal tenderness.     Neurological: He is alert. GCS score is 15. GCS eye subscore is 4. GCS verbal subscore is 5. GCS motor subscore is 6.   Skin: Skin is warm. Capillary refill takes less than 2 seconds. No rash noted.         ED Course   Critical Care    Date/Time: 8/13/2024 9:08 PM    Performed by: Rich Castle MD  Authorized by: Rich Castle MD  Direct patient critical care time: 40 minutes  Total critical care time (exclusive of procedural time) : 40 minutes  Critical care time was exclusive of separately billable procedures and treating other patients.  Critical care was necessary to treat or prevent imminent or life-threatening deterioration of the following conditions: renal failure and dehydration.  Critical care was time spent personally by me on the following activities: development of treatment plan with patient or surrogate, discussions with consultants, evaluation of patient's response to treatment, examination of patient, obtaining history from patient or surrogate, ordering and performing treatments and interventions, ordering and review of laboratory studies, ordering and review of radiographic studies, pulse oximetry, re-evaluation of patient's condition and review of old charts.        Labs Reviewed   COMPREHENSIVE METABOLIC PANEL - Abnormal       Result Value    Sodium 138      Potassium 4.2      Chloride 95 (*)     CO2 26      Glucose 134 (*)     Blood Urea Nitrogen 18.7      Creatinine 3.42 (*)     Calcium 12.1 (*)     Protein Total 10.7 (*)     Albumin 5.9 (*)     Globulin 4.8 (*)     Albumin/Globulin Ratio 1.2      Bilirubin Total 0.7      ALP 97      ALT 40      AST 33      eGFR 24      Anion Gap 17.0      BUN/Creatinine  Ratio 5     CK - Abnormal    Creatine Kinase 534 (*)    CBC WITH DIFFERENTIAL - Abnormal    WBC 18.87 (*)     RBC 5.96      Hgb 18.5 (*)     Hct 51.9      MCV 87.1      MCH 31.0      MCHC 35.6      RDW 12.5      Platelet 280      MPV 10.0      Neut % 87.1      Lymph % 5.1      Mono % 5.1      Eos % 0.0      Basophil % 0.4      Lymph # 0.97      Neut # 16.43 (*)     Mono # 0.97      Eos # 0.00      Baso # 0.07      IG# 0.43 (*)     IG% 2.3      NRBC% 0.0      IPF 8.4     CBC W/ AUTO DIFFERENTIAL    Narrative:     The following orders were created for panel order CBC auto differential.  Procedure                               Abnormality         Status                     ---------                               -----------         ------                     CBC with Differential[[id number]]        Abnormal            Final result                 Please view results for these tests on the individual orders.          Imaging Results    None          Medications   0.9%  NaCl infusion (1,000 mLs Intravenous New Bag 8/13/24 1934)   0.9%  NaCl infusion (1,000 mLs Intravenous New Bag 8/13/24 1845)   ondansetron injection 4 mg (4 mg Intravenous Given 8/13/24 1850)     Medical Decision Making  Problems Addressed:  ZAHIDA (acute kidney injury): acute illness or injury  Dehydration: acute illness or injury  Hypercalcemia: acute illness or injury    Risk  Prescription drug management.                       Differential diagnosis (includes but is not limited to):   ZAHIDA, rhabdomyolysis, dehydration, electrolyte abnormalities    MDM Narrative  23-year-old male presents after working in the heat all day today.  He states he began having nausea and vomiting at home after work.  Aggressive IV fluid resuscitation ordered.  Labs reveal a significant acute kidney injury as well as hypercalcemia.  I have recommended admission to the hospital for further care including continued IV fluids and monitoring to ensure renal recovery.  Patient was  declined admission.  I had an extensive discussion with him at the bedside as well as with his significant other who is present at the bedside regarding risks of leaving the hospital against medical advice which may include further worsening of his symptoms, permanent disability, renal failure, possible dialysis, and even possibly death.  Patient states he understands but does not wish to be admitted to the hospital.  He states he understands the risks of leaving against medical advice.  He was awake and alert, oriented x4, answering questions appropriately, and appears clinically sober.  He was able to verbalize the risks has been discussed.  Patient states he has a primary care physician and I have stressed to him that he needs to follow up as soon as possible for repeat labs.  In the meantime, he needs to continue with aggressive oral rehydration at home.  Strict return precautions discussed.  I have notified the patient that if he changes his mind at any point he may return to the emergency department to proceed with admission at that time.    Dispo: AMA    My independent radiology interpretation:   Point of care US (independently performed and interpreted):   Decision rules/clinical scoring:     Sepsis Perfusion Assessment:     Amount and/or Complexity of Data Reviewed  Independent historian: none   Summary of history:   External data reviewed: notes from previous ED visits and notes from clinic visits  Summary of data reviewed: Prior records reviewed  Risk and benefits of testing: discussed   Labs: ordered and reviewed  Discussion of management or test interpretation with external provider(s): none   Summary of discussion:     Risk  OTC medications  Prescription drug management   Shared decision making     Critical Care  30-74 minutes     Data Reviewed/Counseling: I have personally reviewed the patient's vital signs, nursing notes, and other relevant tests, information, and imaging. I had a detailed discussion  regarding the historical points, exam findings, and any diagnostic results supporting the discharge diagnosis. I personally performed the history, PE, MDM and procedures as documented above and agree with the scribe's documentation.    Portions of this note were dictated using voice recognition software. Although it was reviewed for accuracy, some inherent voice recognition errors may have occurred and may be present in this document.                    Clinical Impression:  Final diagnoses:  [E86.0] Dehydration  [N17.9] ZAHIDA (acute kidney injury) (Primary)  [E83.52] Hypercalcemia          ED Disposition Condition    AMA Rich Rangel MD  08/14/24 0413

## 2024-08-14 NOTE — DISCHARGE INSTRUCTIONS
You have been evaluated in the Emergency Department today. You are refusing further testing, imaging, and further admission and choosing to leave against medical advice. You were advised of your risks of leaving and understand that permanent harm, or even death, can occur from failing to follow the recommendations of the physician.     Please follow up with your primary care physician as needed. If you do not have a primary doctor, you can call your insurance company to find one.  If you do not have insurance, you can go to the finance/registration department for more assistance. Please be aware that you may return to the ED at any point if you change your mind and wish to undergo further testing and management.    Please return to the Emergency Department immediately if you experience worsening or uncontrolled pain, persistent fevers, recurrent vomiting, blood in vomit, blood in stool, dark tarry stool, chest pain, shortness of breath, or for any other concerning symptoms.

## 2025-01-12 ENCOUNTER — ON-DEMAND VIRTUAL (OUTPATIENT)
Dept: URGENT CARE | Facility: CLINIC | Age: 25
End: 2025-01-12
Payer: MEDICAID

## 2025-01-12 DIAGNOSIS — Z20.2 EXPOSURE TO TRICHOMONAS: Primary | ICD-10-CM

## 2025-01-12 RX ORDER — METRONIDAZOLE 500 MG/1
500 TABLET ORAL 2 TIMES DAILY
Qty: 14 TABLET | Refills: 0 | Status: SHIPPED | OUTPATIENT
Start: 2025-01-12 | End: 2025-01-19

## 2025-01-12 NOTE — PROGRESS NOTES
Subjective:      Patient ID: Rich Pendleton is a 24 y.o. male.    Vitals:  vitals were not taken for this visit.     Chief Complaint: Exposure to STD      Visit Type: TELE AUDIOVISUAL - This visit was conducted virtually based on  subjective information and limited objective exam    Present with the patient at the time of consultation: TELEMED PRESENT WITH PATIENT: None  LOCATION OF PATIENT [facility]  Two patient identifiers used to verify patient- saying out date of birth and full name.       Past Medical History:   Diagnosis Date    Atrial fibrillation      History reviewed. No pertinent surgical history.  Review of patient's allergies indicates:  No Known Allergies  Current Outpatient Medications on File Prior to Visit   Medication Sig Dispense Refill    aspirin (ECOTRIN) 81 MG EC tablet Take 81 mg by mouth once daily.      diltiaZEM (CARDIZEM CD) 180 MG 24 hr capsule Take 1 capsule (180 mg total) by mouth every morning. 30 capsule 3    ondansetron (ZOFRAN-ODT) 4 MG TbDL Take 1 tablet (4 mg total) by mouth every 6 (six) hours as needed (Nausea). 12 tablet 0     No current facility-administered medications on file prior to visit.     No family history on file.    Medications Ordered                [id number] - [place] - [address] [facility] [address]   [address] [address]    Telephone: [phone]   Fax: [phone]   Hours: Not open 24 hours                         E-Prescribed (1 of 1)              metroNIDAZOLE (FLAGYL) 500 MG tablet    Sig: Take 1 tablet (500 mg total) by mouth 2 (two) times daily. for 7 days       Start: 1/12/25     Quantity: 14 tablet Refills: 0                           Ohs Peq Odvv Intake    1/12/2025 10:00 AM CST - Filed by Patient   What is your current physical address in the event of a medical emergency? [address]   Are you able to take your vital signs? No   Please attach any  relevant images or files    Is your employer contracted with [facility]? No         HPI    Constitution: Negative.   HENT: Negative.     Cardiovascular: Negative.    Eyes: Negative.    Respiratory: Negative.     Gastrointestinal: Negative.  Negative for bowel incontinence.   Endocrine: negative.   Genitourinary: Negative.  Negative for dysuria, flank pain, bladder incontinence and pelvic pain.   Musculoskeletal: Negative.  Negative for pain, abnormal ROM of joint and back pain.   Skin: Negative.    Allergic/Immunologic: Negative.    Neurological: Negative.    Hematologic/Lymphatic: Negative.    Psychiatric/Behavioral: Negative.          Objective:   The physical exam was conducted virtually.    AAO x 3 ; no acute distress noted; appearance normal; mood and behavior normal; thought process normal  Head- normocephalic  Nose- appears normal, no discharge or erythema  Eyes- pupils appear normal in size, no drainage, no erythema  Ears- normal appearing; no discharge, no erythema  Mouth- appears normal  Oropharynx- no erythema, lesions  Lungs- breathing at a normal rate, no acute distress noted  Heart- no reports of tachycardia, palpitations, chest pain  Abdomen- non distended, non tender reported by patient  Skin- warm and dry, no erythema or edema noted by patient or visualized  Psych- as above; no si/hi      Assessment:     1. Exposure to trichomonas        Plan:     Will send in flagyl. Advised no alcohol  Preventive Measures:  Increase condom use to prevent the occurrence of sexually transmitted infections (STIs).  Notify sexual partners of the need for testing to prevent the spread of STIs.  Medication Compliance:  Complete all prescribed medication courses to ensure effective treatment of infections.  Abstain from Sexual [place] After Treatment:  Avoid sexual intercourse for 7 days after treatment to allow for effective treatment and recovery.  Retesting:  Retest to ensure infection clearance, as  some infections may require more aggressive treatment.  Retest for all infections in 6 weeks and again in 6 months to ensure true negative results.  Importance of Testing:  Regular testing is crucial, especially for those with high-risk sexual behaviors.  Syphilis cases are rising, and gonorrhea has resistant strains, highlighting the importance of repeat testing and preventive measures.  Truvada for PrEP Users:  Individuals on Truvada for pre-exposure prophylaxis (PrEP) have additional protection against HIV only. Condoms should still be used for STI prevention.  Reliable Test Result Timelines:  Testing timelines for various infections are provided, ranging from 2 weeks for trichomonas, gonorrhea, and chlamydia to 6 weeks to 3 months for HIV, herpes, hepatitis C and B.  Repeat Testing:  If initial tests come back negative, it's important to repeat testing in 3-6 months to ensure ongoing negative status.  Regular testing, adherence to preventive measures, and open communication with sexual partners are essential for maintaining sexual health and preventing the spread of STIs. If you have any further questions or concerns, it's recommended to consult with a healthcare provider or sexual health professional.   Thank you for choosing [facility]!    Our goal in the [facility] is to always provide outstanding medical care. You may receive a survey by mail or e-mail in the next week regarding your experience today. We would greatly appreciate you completing and returning the survey. Your feedback provides us with a way to recognize our staff who provide very good care, and it helps us learn how to improve when your experience was below our aspiration of excellence.         We appreciate you trusting us with your medical care. We hope you feel better soon. We will be happy to take care of you for all of your future medical needs.    You must understand that you've received an Urgent  Care treatment only and that you may be released before all your medical problems are known or treated. You, the patient, will arrange for follow up care as instructed.    Follow up with your PCP or specialty clinic as directed in the next 1-2 weeks if not improved or as needed.  You can call [phone] to schedule an appointment with the appropriate provider.    If your condition worsens we recommend that you receive another evaluation in person, with your primary care provider, urgent care or at the emergency room immediately or contact your primary medical clinics after hours call service to discuss your concerns.         Exposure to trichomonas  -     metroNIDAZOLE (FLAGYL) 500 MG tablet; Take 1 tablet (500 mg total) by mouth 2 (two) times daily. for 7 days  Dispense: 14 tablet; Refill: 0

## 2025-06-29 ENCOUNTER — ON-DEMAND VIRTUAL (OUTPATIENT)
Dept: URGENT CARE | Facility: CLINIC | Age: 25
End: 2025-06-29
Payer: MEDICAID

## 2025-06-29 ENCOUNTER — PATIENT MESSAGE (OUTPATIENT)
Dept: URGENT CARE | Facility: CLINIC | Age: 25
End: 2025-06-29

## 2025-06-29 DIAGNOSIS — N48.9 LESION OF PENIS: Primary | ICD-10-CM

## 2025-06-29 PROCEDURE — 98005 SYNCH AUDIO-VIDEO EST LOW 20: CPT | Mod: 95,,, | Performed by: NURSE PRACTITIONER

## 2025-06-29 NOTE — PROGRESS NOTES
Subjective:      Patient ID: Rich Pendleton is a 24 y.o. male.    Vitals:  vitals were not taken for this visit.     Chief Complaint: Rash      Visit Type: TELE AUDIOVISUAL    Patient Location: Home [place]    Present with the patient at the time of consultation: TELEMED PRESENT WITH PATIENT: None    Past Medical History:   Diagnosis Date    Atrial fibrillation      History reviewed. No pertinent surgical history.  Review of patient's allergies indicates:  No Known Allergies  Medications Ordered Prior to Encounter[1]  No family history on file.        Ohs Peq Odvv Intake    6/29/2025 12:48 PM CDT - Filed by Patient   What is your current physical address in the event of a medical emergency? [address]   Are you able to take your vital signs? No   Please attach any relevant images or files    Is your employer contracted with [facility]? No         Patient doing virtual visit for rash to penis  Area does not hurt but it itches  There is a bump to the shaft of the penis  Rash showed up last week     Rash        Genitourinary:         Lesion to penis   Skin:  Positive for rash.        Objective:   The physical exam was conducted virtually.  Physical Exam   Constitutional: He does not appear ill. No distress.   HENT:   Head: Normocephalic and atraumatic.   Nose: Nose normal.   Eyes: Conjunctivae are normal. Right eye exhibits no discharge. Left eye exhibits no discharge.   Pulmonary/Chest: Effort normal. No stridor. No respiratory distress.   Abdominal: Normal appearance.   Neurological: He is alert and at baseline.   Psychiatric: His behavior is normal. Mood and thought content normal.       Assessment:     1. Lesion of penis        Plan:       Lesion of penis    Patient needs in person exam for further evaluation and mgt of lesion to penis  Encouraged him to go to Urgent Care   Patient verbalized understanding                   [1]   Current Outpatient Medications on File Prior to Visit    Medication Sig Dispense Refill    aspirin (ECOTRIN) 81 MG EC tablet Take 81 mg by mouth once daily.      diltiaZEM (CARDIZEM CD) 180 MG 24 hr capsule Take 1 capsule (180 mg total) by mouth every morning. 30 capsule 3    ondansetron (ZOFRAN-ODT) 4 MG TbDL Take 1 tablet (4 mg total) by mouth every 6 (six) hours as needed (Nausea). 12 tablet 0     No current facility-administered medications on file prior to visit.